# Patient Record
Sex: FEMALE | URBAN - METROPOLITAN AREA
[De-identification: names, ages, dates, MRNs, and addresses within clinical notes are randomized per-mention and may not be internally consistent; named-entity substitution may affect disease eponyms.]

---

## 2022-03-21 ENCOUNTER — OFFICE VISIT (OUTPATIENT)
Dept: URBAN - METROPOLITAN AREA CLINIC 24 | Facility: CLINIC | Age: 47
End: 2022-03-21
Payer: MEDICARE

## 2022-03-21 DIAGNOSIS — H25.813 COMBINED FORMS OF AGE-RELATED CATARACT, BILATERAL: ICD-10-CM

## 2022-03-21 DIAGNOSIS — Z79.84 LONG TERM (CURRENT) USE OF ORAL ANTIDIABETIC DRUGS: ICD-10-CM

## 2022-03-21 DIAGNOSIS — H16.223 KERATOCONJUNCTIVITIS SICCA, BILATERAL: ICD-10-CM

## 2022-03-21 DIAGNOSIS — E11.9 DIABETES MELLITUS TYPE 2 WITHOUT MENTION OF COMPLICATION: Primary | ICD-10-CM

## 2022-03-21 PROCEDURE — 92004 COMPRE OPH EXAM NEW PT 1/>: CPT | Performed by: STUDENT IN AN ORGANIZED HEALTH CARE EDUCATION/TRAINING PROGRAM

## 2022-03-21 PROCEDURE — 92134 CPTRZ OPH DX IMG PST SGM RTA: CPT | Performed by: STUDENT IN AN ORGANIZED HEALTH CARE EDUCATION/TRAINING PROGRAM

## 2022-03-21 ASSESSMENT — INTRAOCULAR PRESSURE
OD: 8
OS: 9

## 2022-03-21 ASSESSMENT — VISUAL ACUITY
OS: 20/20
OD: 20/20

## 2022-03-21 NOTE — IMPRESSION/PLAN
Impression: Diabetes mellitus Type 2 without mention of complication: I60.5. Plan: Pt ed no signs diabetic retinopathy. Discuss benefits of steady blood glucose control and follow care with PCP.   Patient was instructed to monitor vision for sudden changes and to call if visual changes noted

## 2022-03-21 NOTE — IMPRESSION/PLAN
Impression: Keratoconjunctivitis sicca, bilateral: P11.812. Plan: Dry eyes account for the patient's complaints. There is no evidence of permanent changes to the cornea. Explained condition does not have a cure, is a chronic condition and will need consistent artificial tears use for maintenance.  
Increase art tears to 3-4x/day prn OU

## 2022-03-21 NOTE — IMPRESSION/PLAN
Impression: Combined forms of age-related cataract, bilateral: H25.813. Plan: Cataracts not visually significant and ADL not significantly affected. No treatment currently recommended. The patient will monitor vision changes and contact us with any decrease in vision.

## 2023-06-23 NOTE — IMPRESSION/PLAN
Impression: Punctate keratitis, bilateral: H16.143. Plan: Switch to PF art tears qid prn OU given burning concerns.

## 2023-06-23 NOTE — IMPRESSION/PLAN
Impression: Combined forms of age-related cataract, bilateral: H25.813. Plan: Stable, ADL not significantly affected. No treatment currently recommended.  Monitor

## 2023-06-23 NOTE — IMPRESSION/PLAN
Impression: Diabetes mellitus Type 2 without mention of complication: A87.4.
-- controlled per pt but la1c from 1 wk ago Plan: Pt ed no signs diabetic retinopathy. Discuss benefits of steady blood glucose control and follow care with PCP.   Patient was instructed to monitor vision for sudden changes and to call if visual changes noted